# Patient Record
Sex: FEMALE | Race: BLACK OR AFRICAN AMERICAN | Employment: STUDENT | URBAN - METROPOLITAN AREA
[De-identification: names, ages, dates, MRNs, and addresses within clinical notes are randomized per-mention and may not be internally consistent; named-entity substitution may affect disease eponyms.]

---
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
## 2024-01-12 ENCOUNTER — OFFICE VISIT (OUTPATIENT)
Dept: OBGYN CLINIC | Age: 19
End: 2024-01-12
Payer: COMMERCIAL

## 2024-01-12 VITALS
SYSTOLIC BLOOD PRESSURE: 112 MMHG | WEIGHT: 204 LBS | BODY MASS INDEX: 32.02 KG/M2 | HEIGHT: 67 IN | DIASTOLIC BLOOD PRESSURE: 82 MMHG

## 2024-01-12 DIAGNOSIS — E28.2 PCOS (POLYCYSTIC OVARIAN SYNDROME): ICD-10-CM

## 2024-01-12 DIAGNOSIS — N94.6 DYSMENORRHEA: Primary | ICD-10-CM

## 2024-01-12 PROCEDURE — G8482 FLU IMMUNIZE ORDER/ADMIN: HCPCS | Performed by: OBSTETRICS & GYNECOLOGY

## 2024-01-12 PROCEDURE — 1036F TOBACCO NON-USER: CPT | Performed by: OBSTETRICS & GYNECOLOGY

## 2024-01-12 PROCEDURE — G8417 CALC BMI ABV UP PARAM F/U: HCPCS | Performed by: OBSTETRICS & GYNECOLOGY

## 2024-01-12 PROCEDURE — 99213 OFFICE O/P EST LOW 20 MIN: CPT | Performed by: OBSTETRICS & GYNECOLOGY

## 2024-01-12 PROCEDURE — G8427 DOCREV CUR MEDS BY ELIG CLIN: HCPCS | Performed by: OBSTETRICS & GYNECOLOGY

## 2024-01-12 RX ORDER — NORETHINDRONE ACETATE AND ETHINYL ESTRADIOL 1MG-20(21)
1 KIT ORAL DAILY
Qty: 30 TABLET | Refills: 11 | Status: SHIPPED | OUTPATIENT
Start: 2024-01-12 | End: 2024-01-12 | Stop reason: SDUPTHER

## 2024-01-12 RX ORDER — METFORMIN HYDROCHLORIDE 500 MG/1
500 TABLET, EXTENDED RELEASE ORAL 2 TIMES DAILY
Qty: 60 TABLET | Refills: 12 | Status: SHIPPED | OUTPATIENT
Start: 2024-01-12

## 2024-01-12 RX ORDER — NORETHINDRONE ACETATE AND ETHINYL ESTRADIOL 1MG-20(21)
1 KIT ORAL DAILY
Qty: 30 TABLET | Refills: 0 | Status: SHIPPED | OUTPATIENT
Start: 2024-01-12

## 2024-01-12 NOTE — PROGRESS NOTES
Jeanette Miller  2025      Jeanette Miller is a 18 y.o. female       The patient was seen today. She was here to follow-up regarding her labs and diagnostics ordered at her last visit for the diagnosis of:    ICD-10-CM    1. Dysmenorrhea  N94.6 norethindrone-ethinyl estradiol (BLISOVI FE ) 1-20 MG-MCG per tablet     DISCONTINUED: norethindrone-ethinyl estradiol (BLISOVI FE ) 1-20 MG-MCG per tablet      2. PCOS (polycystic ovarian syndrome)  E28.2 metFORMIN (GLUCOPHAGE-XR) 500 MG extended release tablet     norethindrone-ethinyl estradiol (BLISOVI FE ) 1-20 MG-MCG per tablet     DISCONTINUED: norethindrone-ethinyl estradiol (BLISOVI FE ) 1-20 MG-MCG per tablet          She does not have any specific chief complaint today. Her bowels are regular and she is voiding without difficulty. The pt is tolerating the Metformin and OCP's with excellent results. +weight loss and acne resolved. No cc. SE and thromboembolic risks reviewed again. Diet reviewed. HRT consent signed. Pt is not sexually active      Past Medical History:   Diagnosis Date    Depression          History reviewed. No pertinent surgical history.      Family History   Problem Relation Age of Onset    Breast Cancer Maternal Grandmother 60    Stroke Maternal Grandmother     Hypertension Maternal Grandmother     Cancer Maternal Grandfather         blood cancer    Hypertension Maternal Grandfather     Prostate Cancer Other     Breast Cancer Maternal Cousin          Social History     Tobacco Use    Smoking status: Never    Smokeless tobacco: Never   Vaping Use    Vaping Use: Never used   Substance Use Topics    Alcohol use: No    Drug use: No         MEDICATIONS:  Current Outpatient Medications   Medication Sig Dispense Refill    metFORMIN (GLUCOPHAGE-XR) 500 MG extended release tablet Take 1 tablet by mouth 2 times daily 60 tablet 12    norethindrone-ethinyl estradiol (BLISOVI FE 20) 1-20 MG-MCG per tablet Take 1 tablet by mouth

## 2024-01-17 ENCOUNTER — TELEPHONE (OUTPATIENT)
Dept: OBGYN CLINIC | Age: 19
End: 2024-01-17

## 2024-01-17 DIAGNOSIS — E28.2 PCOS (POLYCYSTIC OVARIAN SYNDROME): ICD-10-CM

## 2024-01-17 RX ORDER — METFORMIN HYDROCHLORIDE 500 MG/1
500 TABLET, EXTENDED RELEASE ORAL 2 TIMES DAILY
Qty: 60 TABLET | Refills: 12 | Status: SHIPPED | OUTPATIENT
Start: 2024-01-17

## 2024-01-17 NOTE — TELEPHONE ENCOUNTER
Mom called to check status of Rx refills requested on 1/12/24 for her daughter.    Mom informed Birthcontrol was sent to [facility] on [address], however Metformin was sent to [facility].     -Mom requesting that Metformin be resent to [facility] on [address]    [phone] if need to discuss ( Bonita Materni- mom )

## 2024-03-21 ENCOUNTER — TELEPHONE (OUTPATIENT)
Dept: OBGYN CLINIC | Age: 19
End: 2024-03-21

## 2024-03-21 DIAGNOSIS — E28.2 PCOS (POLYCYSTIC OVARIAN SYNDROME): ICD-10-CM

## 2024-03-21 DIAGNOSIS — N94.6 DYSMENORRHEA: ICD-10-CM

## 2024-03-21 RX ORDER — NORETHINDRONE ACETATE AND ETHINYL ESTRADIOL, ETHINYL ESTRADIOL AND FERROUS FUMARATE 1MG-10(24)
1 KIT ORAL DAILY
Qty: 1 PACKET | Refills: 1 | Status: SHIPPED | OUTPATIENT
Start: 2024-03-21

## 2024-03-21 RX ORDER — NORETHINDRONE ACETATE AND ETHINYL ESTRADIOL 1MG-20(21)
1 KIT ORAL DAILY
Qty: 30 TABLET | Refills: 0 | Status: CANCELLED | OUTPATIENT
Start: 2024-03-21

## 2024-03-21 RX ORDER — NORETHINDRONE ACETATE AND ETHINYL ESTRADIOL, ETHINYL ESTRADIOL AND FERROUS FUMARATE 1MG-10(24)
1 KIT ORAL DAILY
COMMUNITY
Start: 2024-03-11 | End: 2024-03-21 | Stop reason: SDUPTHER

## 2024-03-21 NOTE — TELEPHONE ENCOUNTER
Pt requesting refill for BC ( low estrogen ) - due to losing package at school.    Please send to [facility] [place]- ( away at [facility])

## 2024-03-21 NOTE — TELEPHONE ENCOUNTER
Pt mother Bonita returned call; states Dr. Paredes put pt back on lo-loestrin in 1/2024.  Notified that she may have to pay out of pocket for it.  Bonita verbalized understanding.

## 2024-05-20 DIAGNOSIS — E28.2 PCOS (POLYCYSTIC OVARIAN SYNDROME): ICD-10-CM

## 2024-05-20 DIAGNOSIS — N94.6 DYSMENORRHEA: ICD-10-CM

## 2024-05-20 RX ORDER — NORETHINDRONE ACETATE AND ETHINYL ESTRADIOL, ETHINYL ESTRADIOL AND FERROUS FUMARATE 1MG-10(24)
1 KIT ORAL DAILY
Qty: 3 PACKET | Refills: 2 | Status: SHIPPED | OUTPATIENT
Start: 2024-05-20

## 2024-05-20 NOTE — TELEPHONE ENCOUNTER
Pt needs refill on her bc called into [facility] [place] , pt is back from school , pt needs a 3 month refill called for insurance purposes

## 2024-05-30 DIAGNOSIS — E28.2 PCOS (POLYCYSTIC OVARIAN SYNDROME): ICD-10-CM

## 2024-05-30 DIAGNOSIS — N94.6 DYSMENORRHEA: ICD-10-CM

## 2024-05-30 RX ORDER — METFORMIN HYDROCHLORIDE 500 MG/1
500 TABLET, EXTENDED RELEASE ORAL 2 TIMES DAILY
Qty: 60 TABLET | Refills: 12 | Status: SHIPPED | OUTPATIENT
Start: 2024-05-30

## 2024-05-30 RX ORDER — NORETHINDRONE ACETATE AND ETHINYL ESTRADIOL 1MG-20(21)
1 KIT ORAL DAILY
Qty: 30 TABLET | Refills: 10 | Status: SHIPPED | OUTPATIENT
Start: 2024-05-30

## 2024-05-30 NOTE — TELEPHONE ENCOUNTER
Pts mother called to f/u re: Birthcontrol refill request made last week, and states rx was discontinued by pharmacy.     Pt needs BC & metformin sent to  [facility] [place], [place] (since shes at school )

## 2024-06-04 DIAGNOSIS — E28.2 PCOS (POLYCYSTIC OVARIAN SYNDROME): ICD-10-CM

## 2024-06-04 RX ORDER — METFORMIN HYDROCHLORIDE 500 MG/1
500 TABLET, EXTENDED RELEASE ORAL 2 TIMES DAILY
Qty: 180 TABLET | Refills: 0 | Status: SHIPPED | OUTPATIENT
Start: 2024-06-04 | End: 2024-09-02

## 2024-06-04 NOTE — TELEPHONE ENCOUNTER
Pt's mom called in ans she her daughter needs her Metformin , called in for 90 days now to [facility] in [facility] I changed the Pharmacy in computer

## 2024-10-22 ENCOUNTER — TELEPHONE (OUTPATIENT)
Dept: OBGYN CLINIC | Age: 19
End: 2024-10-22

## 2024-10-22 NOTE — TELEPHONE ENCOUNTER
Pt's mom called she is on HIPAA , has questions about her metformin , weight loss and dosage change , please call her

## 2024-10-22 NOTE — TELEPHONE ENCOUNTER
Pt currently at college and her mother is requesting VV appointment with Dr Paredes to discuss Metformin.  Pt to call to schedule VV appointment due to school schedule.

## 2025-01-09 DIAGNOSIS — E28.2 PCOS (POLYCYSTIC OVARIAN SYNDROME): ICD-10-CM

## 2025-01-09 RX ORDER — METFORMIN HYDROCHLORIDE 500 MG/1
500 TABLET, EXTENDED RELEASE ORAL 2 TIMES DAILY
Qty: 180 TABLET | Refills: 0 | OUTPATIENT
Start: 2025-01-09